# Patient Record
(demographics unavailable — no encounter records)

---

## 2024-10-31 NOTE — END OF VISIT
[FreeTextEntry3] :   I, Elina Arabella, acted as a scribe on behalf of Dr. Meseret Almanza M.D  on 10/30/2024.   All medical entries made by the scribe were at my, Dr. Meseret Almanza M.D ,  direction and personally dictated by me on 10/30/2024. I have reviewed the chart and agree that the record accurately reflects my personal performance of the history, physical exam, assessment and plan. I have also personally directed, reviewed, and agreed with the chart.

## 2024-10-31 NOTE — PLAN
[FreeTextEntry1] : 67 year  old female  presents for annual visit.   HCM  - Pap UTD - Rx dexa - Rx breast mammo - Referred to Urogyn   RTO 1yr

## 2024-10-31 NOTE — PHYSICAL EXAM
[Appropriately responsive] : appropriately responsive [Alert] : alert [No Acute Distress] : no acute distress [No Lymphadenopathy] : no lymphadenopathy [Soft] : soft [Non-tender] : non-tender [Non-distended] : non-distended [No HSM] : No HSM [No Lesions] : no lesions [No Mass] : no mass [Oriented x3] : oriented x3 [Examination Of The Breasts] : a normal appearance [No Masses] : no breast masses were palpable [Labia Majora] : normal [Labia Minora] : normal [Uterine Prolapse] : uterine prolapse [Normal] : normal [Uterine Adnexae] : normal [FreeTextEntry6] : uterus prolapsed to introitus

## 2024-10-31 NOTE — HISTORY OF PRESENT ILLNESS
[FreeTextEntry1] : 67 year  old female   presents for annual visit. Last seen 2022 neg paph/hpv. Pt has uterine prolapse, was referred to Urogyn at time of last  visit.  dexa in - wnl. Mammo 2023- wnl. Pt cont to have sx of uterine prolapse, has not followed up with urogyn.    ob hx:  x3 (diabetes with preg), 1 miscarriage

## 2024-11-11 NOTE — ASSESSMENT
[FreeTextEntry1] : 67 year old woman with Type 2 diabetes, well controlled and obesity,  - may continue off of jardiance (no CHF, no albuminuria, glucose controlled; discussed with patient that it may aid with weight loss), continue metformin  - Encouraged pt to continue with diet and  increase exercise  - Continue mounjaro 15 mg  - retinopathy screening up to date   HTN - blood pressure at goal, continue current regimen,   - urine microalbumin neg  Hyperlipidemia - continue atorvastatin 80,   Hypothyroidism -continue levothyroxine  Thyroid nodules -  repeat ultrasound one year from prior to monitor for change  Vitamin d def'y - continue vitamin D  f/u 4 months.

## 2024-11-11 NOTE — QUALITY MEASURES
[Visual inspection, sensory exam] : Foot exam, including visual inspection, sensory exam with mono filament, and pulse exam, was performed within the last 12 months [TextEntry] : foot exam 7/2024

## 2024-11-11 NOTE — HISTORY OF PRESENT ILLNESS
[FreeTextEntry1] : cc: Diabetes;   67 year old woman with T2DM, well controlled andobesity.   She checks glucose most mornings and values have  under 100 mg/dl.   No hypoglycemia. Current taking metformin 500 mg twice daily and  Mounjaro 15 mg weekly. She stopped the jardiance, felt that she did not need it. 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  Takes lyrica and duloxetine.    She also has hypertension, controlled with hydrochlorothiazide and metoprolol.  Not on ACE/ARB  Hyperlipidemia - taking atorvastatin 80  Hypothyroidism, on levothyroxine 100 micro grams daily, in the morning. No new symptoms  Thyroid nodules - she reports no  change in her thyroid.

## 2024-12-04 NOTE — HISTORY OF PRESENT ILLNESS
[FreeTextEntry1] : Anti-obesity medications: Mounjaro  Obesity medication side effects: none  Bariatric surgery history: no  Obesity co-morbidities: DM , htn, hld, oa  Co-morbidities improved or resolved: type 2 dm, hld  last weight: 158 current weight: 150 other pmhx:  thyroid nodules, retinopathy, vision loss (r eye), neuropathy  social: no smoke, no etoh family: dm/cad, alz  normall dexa  66 yo female with class 1 obesity, type 2 dm in remission, oa, htn, hld presents for f/u weight management, on Mounjaro 15mg since our last visit with 8lbs weight loss since that time.  She reports mild nausea but not significant or lasting.

## 2024-12-04 NOTE — ASSESSMENT
[FreeTextEntry1] : 66 yo female with class 1 obesity, formerly class 2, BMI now  30 requires medical weight loss therapy due to weight history and the positive effects weight loss can have on comorbid conditions of type 2 dm (in remission), oa, htn and hld :  -cw Mounjaro 15mg - 3 month supply renewed  -h/o DM2, latest A1c in normal limits @ 5.5 - followed by endocrinology  -thyroid nodules and replacement dosing followed by endo, plan for repeat us 9/25 -hld - cw statin lipids checked 7/24 and in range  - chronic OA - planned for lka 9/24 - f/u ortho - htn - she just saw her pcp today and had all her meds renewed, no changes per patient  -follow up 3 months or prn

## 2024-12-04 NOTE — HISTORY OF PRESENT ILLNESS
[FreeTextEntry1] : Anti-obesity medications: Mounjaro  Obesity medication side effects: none  Bariatric surgery history: no  Obesity co-morbidities: DM , htn, hld, oa  Co-morbidities improved or resolved: type 2 dm, hld  last weight: 158 current weight: 150 other pmhx:  thyroid nodules, retinopathy, vision loss (r eye), neuropathy  social: no smoke, no etoh family: dm/cad, alz  normall dexa  68 yo female with class 1 obesity, type 2 dm in remission, oa, htn, hld presents for f/u weight management, on Mounjaro 15mg since our last visit with 8lbs weight loss since that time.  She reports mild nausea but not significant or lasting.

## 2024-12-04 NOTE — ASSESSMENT
[FreeTextEntry1] : 68 yo female with class 1 obesity, formerly class 2, BMI now  30 requires medical weight loss therapy due to weight history and the positive effects weight loss can have on comorbid conditions of type 2 dm (in remission), oa, htn and hld :  -cw Mounjaro 15mg - 3 month supply renewed  -h/o DM2, latest A1c in normal limits @ 5.5 - followed by endocrinology  -thyroid nodules and replacement dosing followed by endo, plan for repeat us 9/25 -hld - cw statin lipids checked 7/24 and in range  - chronic OA - planned for lka 9/24 - f/u ortho - htn - she just saw her pcp today and had all her meds renewed, no changes per patient  -follow up 3 months or prn

## 2024-12-04 NOTE — REASON FOR VISIT
[Home] : at home, [unfilled] , at the time of the visit. [Other Location: e.g. Home (Enter Location, City,State)___] : at [unfilled] [Family Member] : family member [Patient] : the patient [Follow-Up] : a follow-up visit [FreeTextEntry1] : obesity

## 2024-12-16 NOTE — PHYSICAL EXAM
[Chaperone Present] : A chaperone was present in the examining room during all aspects of the physical examination [[id number]] : A chaperone was present during the pelvic exam. [FreeTextEntry2] : RAYMUNDO Underwood [FreeTextEntry1] : General: Well, appearing, no acute distress HEENT: Normocephalic, atraumatic Respiratory: Speaking in full sentences comfortably, normal work of breathing and no cough during visit Extremities: No upper extremity edema noted Skin: No obvious rash or skin lesions Neuro: Alert and oriented x 3, speech is fluent, normal rate Psych: Normal mood and affect [Normal Appearance] : general appearance was normal [Dry Mucosa] : dry mucosa [Aa ____] : Aa [unfilled] [Ba ____] : Ba [unfilled] [C ____] : C [unfilled] [GH ____] : GH [unfilled] [PB ____] : PB [unfilled] [TVL ____] : TVL  [unfilled] [Ap ____] : Ap [unfilled] [Bp ____] : Bp [unfilled] [D ____] : D [unfilled] [Normal] : normal [Exam Deferred] : was deferred

## 2024-12-16 NOTE — DISCUSSION/SUMMARY
[FreeTextEntry1] : I counseled the patient and her daughter on the risk factors and etiologies of pelvic organ prolapse. We reviewed exam findings and the degree of her prolapse. Her PVR was 10 ml. We reviewed management options, which included continued observation, Kegels and/or pelvic floor physical therapy, pessary, and surgery. Surgically, we abdominal and vaginal approach to surgical intervention as well as native tissue versus graft-augmented reconstructions. We also reviewed alternative of vaginal closure. We discussed relative success of all surgical approaches and the elective nature of surgery. We also discussed that all surgical approaches carry a risk of recurrence of prolapse in the future. She desires surgical management with vaginal native tissue repair. We discussed uterine preservation vs hysterectomy and risks/benefit. Will likely want to proceed without hysterectomy. [facility] patient handouts on surgical options were provided. Discussed need for UDS and f/u after. Pt is also aware of need to obtain medical clearances once surgery date is determined.

## 2025-01-24 NOTE — PROCEDURE
[Straight Catheterization] : insertion of a straight catheter [Other ___] : [unfilled] [Patient] : the patient [Allergies Reviewed] : Allergies reviewed [___ Fr Straight Tip] : a [unfilled] in [place] straight tip catheter [None] : there were no complications with the catheter insertion [Clear] : clear [Culture] : culture [Urinalysis] : urinalysis [No Complications] : no complications [Tolerated Well] : the patient tolerated the procedure well

## 2025-01-24 NOTE — PROCEDURE
[Straight Catheterization] : insertion of a straight catheter [Other ___] : [unfilled] [Patient] : the patient [Allergies Reviewed] : Allergies reviewed [___ Fr Straight Tip] : a [unfilled] in [place] straight tip catheter [None] : there were no complications with the catheter insertion [Clear] : clear [Culture] : culture [Urinalysis] : urinalysis [No Complications] : no complications [Tolerated Well] : the patient tolerated the procedure well

## 2025-01-26 NOTE — DISCUSSION/SUMMARY
[FreeTextEntry1] : #Abnormal urine: -UA and UCX collected via catheter. Will follow up with results.   #Dispo: -Patient has telehealth visit on 1/30/25 with Dr. Perez.   All questions answered to pt satisfaction.  Pt can call the office with any additional questions or concerns; pt verbalized understanding.

## 2025-01-30 NOTE — HISTORY OF PRESENT ILLNESS
[FreeTextEntry1] : Audio visit only  Called and spoke with patient and her daughter Lilly for follow-up of prolapse. The patient desires surgical management and underwent urodynamic testing which revealed occult CÉSAR. There was no evidence of dysfunctional voiding.   Pelvic US 2022: globular 9.6 cm uterus, EMS 3 mm, normal adnexa Last pap 7/2022: NILM, HPV negative

## 2025-01-30 NOTE — DISCUSSION/SUMMARY
[FreeTextEntry1] : We reviewed previous discussion and management alternatives of prolapse. The patient desires vaginal native tissue repair as she desires to avoid placement of mesh material. We discussed uterine preservation versus concomitant hysterectomy. She would like to have a hysterectomy at time of prolapse repair as she wishes to reduce her risk of uterine pathology in the future. The patient would also be interested in a BSO if adnexa are easily accessible via a vaginal approach, though we reviewed option of removal of adnexa laparoscopically if she desires and vaginal approach proves to be difficult. We also discussed the risk of recurrence of prolapse. We reviewed urodynamic testing results and discussed alternative management approaches including watchful waiting or concomitant anti-incontinence procedure; the latter would include urethral bulking and sling placement. The patient expressed a desire to avoid mesh placement. At this time, she would like to forgo any incontinence procedure. We reviewed expected recovery and restrictions. She would like to proceed with scheduling an total hysterectomy, BSO, uterosacral ligament suspension, anterior/posterior colporrhaphy, cystoscopy.

## 2025-03-05 NOTE — REASON FOR VISIT
[Home] : at home, [unfilled] , at the time of the visit. [Family Member] : family member [Patient] : the patient [Follow-Up] : a follow-up visit [Medical Office: ([facility])___] : at the medical office located in  [FreeTextEntry1] : obesity

## 2025-03-05 NOTE — HISTORY OF PRESENT ILLNESS
[FreeTextEntry1] : Anti-obesity medications: Mounjaro  Obesity medication side effects: none  Bariatric surgery history: no  Obesity co-morbidities: DM , htn, hld, oa  Co-morbidities improved or resolved: type 2 dm, hld  current weight: 150 other pmhx:  thyroid nodules, retinopathy, vision loss (r eye), neuropathy , OA (she had the right knee done already and needs the left replaced), hypothyroid  normal dexa  66 yo female with class 1 obesity, type 2 dm in remission, oa, htn, hld presents for f/u weight management. She has been on Mounjaro 15mg since September. This helped her lose additional weight but she is now at a plateau around 150lbs.  Her daughter is with her today and was inquiring about additional medication her mother might have to help drive more weight loss.  They have some exercise equipment in the house and she reports her mother uses the treadmill or the bike somewhat regularly, but it does hurt her left knee (this needs to be replaced and she is holding off on surgery).  They have some light weights that she uses as well.  She also reports her mother used to eat meat and fish but no longer has the taste for this so she eats mostly vegetables (lentils, cabbage, sweet potatoes, corn). She cooks with no butter or oil. She boils the veges or makes soup.  She eats yogurt everyday and she'll have a protein bar or shake a couple times a week.  She also likes to eat fruit.

## 2025-03-05 NOTE — ASSESSMENT
[FreeTextEntry1] : 68 yo female with class 1 obesity, formerly class 2 and type 2 DM (now in remission):  type 2 dm in remission/obesity class 1 (formerly 2) -cw Mounjaro 15mg - 3 month supply renewed  - I would not start an additional weight loss medication at this time as diet and lifestyle could be optimized further - chronic OA - needs left knee. this was planned for 9/24 but is now on hold. I asked about possibly doing PT to help increase her exercise tolerance (strengthen the muscles around the knee) but reportedly the orthopedist didn't think pt would help (likely surgery is the best option). She was encouraged to try to increase her aerobic activity both for health and for weight management. I suggested doing 20 min three times a day and varying the activity. If the knee is a limiting factor then surgery might be the next best step (but only she can make this decision) -Rd referral for help with further weight loss   -h/o dm2, thyroid nodules and hypothyroid:  followed by endocrinology, has f/u planned for next month -hld - cw statin, managed by cards  - htn - cw meds, managed by pcp  -follow up 3 months or prn

## 2025-03-05 NOTE — REASON FOR VISIT
[Home] : at home, [unfilled] , at the time of the visit. [Family Member] : family member [Patient] : the patient [Follow-Up] : a follow-up visit [Medical Office: ([facility] [place]___] : at the medical office located in  [FreeTextEntry1] : obesity no

## 2025-03-05 NOTE — ASSESSMENT
[FreeTextEntry1] : 66 yo female with class 1 obesity, formerly class 2 and type 2 DM (now in remission):  type 2 dm in remission/obesity class 1 (formerly 2) -cw Mounjaro 15mg - 3 month supply renewed  - I would not start an additional weight loss medication at this time as diet and lifestyle could be optimized further - chronic OA - needs left knee. this was planned for 9/24 but is now on hold. I asked about possibly doing PT to help increase her exercise tolerance (strengthen the muscles around the knee) but reportedly the orthopedist didn't think pt would help (likely surgery is the best option). She was encouraged to try to increase her aerobic activity both for health and for weight management. I suggested doing 20 min three times a day and varying the activity. If the knee is a limiting factor then surgery might be the next best step (but only she can make this decision) -Rd referral for help with further weight loss   -h/o dm2, thyroid nodules and hypothyroid:  followed by endocrinology, has f/u planned for next month -hld - cw statin, managed by cards  - htn - cw meds, managed by pcp  -follow up 3 months or prn